# Patient Record
Sex: FEMALE | Race: WHITE | NOT HISPANIC OR LATINO | Employment: UNEMPLOYED | URBAN - NONMETROPOLITAN AREA
[De-identification: names, ages, dates, MRNs, and addresses within clinical notes are randomized per-mention and may not be internally consistent; named-entity substitution may affect disease eponyms.]

---
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
## 2023-02-10 ENCOUNTER — OFFICE VISIT (OUTPATIENT)
Dept: FAMILY MEDICINE CLINIC | Facility: CLINIC | Age: 18
End: 2023-02-10
Payer: COMMERCIAL

## 2023-02-10 VITALS
DIASTOLIC BLOOD PRESSURE: 76 MMHG | OXYGEN SATURATION: 98 % | BODY MASS INDEX: 37.05 KG/M2 | SYSTOLIC BLOOD PRESSURE: 122 MMHG | HEART RATE: 87 BPM | HEIGHT: 64 IN | WEIGHT: 217 LBS

## 2023-02-10 DIAGNOSIS — F41.9 ANXIETY: ICD-10-CM

## 2023-02-10 DIAGNOSIS — R45.4 OUTBURSTS OF ANGER: ICD-10-CM

## 2023-02-10 DIAGNOSIS — G89.29 CHRONIC MIDLINE LOW BACK PAIN WITHOUT SCIATICA: ICD-10-CM

## 2023-02-10 DIAGNOSIS — M54.50 CHRONIC MIDLINE LOW BACK PAIN WITHOUT SCIATICA: ICD-10-CM

## 2023-02-10 PROCEDURE — 99214 OFFICE O/P EST MOD 30 MIN: CPT | Performed by: FAMILY MEDICINE

## 2023-02-10 RX ORDER — CELECOXIB 100 MG/1
100 CAPSULE ORAL DAILY
Qty: 60 CAPSULE | Refills: 3 | Status: SHIPPED | OUTPATIENT
Start: 2023-02-10

## 2023-02-10 RX ORDER — BUPROPION HYDROCHLORIDE 150 MG/1
TABLET, EXTENDED RELEASE ORAL
Qty: 60 TABLET | Refills: 2 | Status: SHIPPED | OUTPATIENT
Start: 2023-02-10

## 2023-02-10 NOTE — PROGRESS NOTES
Subjective   SHEA ORTIZ is a 18 y.o. female.   Cc: follow up of chronic medical issues    Anxiety  Presents for follow-up visit. Symptoms include decreased concentration, depressed mood, excessive worry, insomnia, irritability, nervous/anxious behavior, palpitations and restlessness. Patient reports no chest pain, compulsions, confusion, dizziness, feeling of choking, hyperventilation, obsessions, panic, shortness of breath or suicidal ideas.     Her past medical history is significant for depression.   Depression  Visit Type: follow-up  Patient presents with the following symptoms: decreased concentration, depressed mood, excessive worry, fatigue, insomnia, irritability, nervousness/anxiety, palpitations and restlessness.  Patient is not experiencing: anhedonia, chest pain, compulsions, confusion, dizziness, feelings of hopelessness, feelings of worthlessness, hypersomnia, hyperventilation, obsessions, panic, shortness of breath, suicidal ideas, suicidal planning, thoughts of death and weight loss.    Back Pain  This is a chronic problem. The current episode started more than 1 month ago. The problem occurs constantly. The problem has been gradually worsening since onset. The pain is present in the lumbar spine and sacro-iliac. The quality of the pain is described as burning, shooting and stabbing. The pain does not radiate. The pain is at a severity of 9/10. The pain is the same all the time. The symptoms are aggravated by bending, coughing, position, standing and twisting. Pertinent negatives include no abdominal pain, bladder incontinence, bowel incontinence, chest pain, dysuria, fever, headaches, leg pain, numbness, paresis, paresthesias, pelvic pain, perianal numbness, tingling, weakness or weight loss.       The following portions of the patient's history were reviewed and updated as appropriate: allergies, current medications, past family history, past medical history, past social history, past  surgical history and problem list.    Review of Systems   Constitutional: Positive for irritability. Negative for fever and weight loss.   Respiratory: Negative for shortness of breath.    Cardiovascular: Positive for palpitations. Negative for chest pain.   Gastrointestinal: Negative for abdominal pain and bowel incontinence.   Genitourinary: Negative for bladder incontinence, dysuria and pelvic pain.   Musculoskeletal: Positive for back pain.   Neurological: Negative for dizziness, tingling, weakness, numbness, headaches and paresthesias.   Psychiatric/Behavioral: Positive for decreased concentration. Negative for confusion and suicidal ideas. The patient is nervous/anxious and has insomnia.        Objective   Physical Exam  Vitals reviewed.   Constitutional:       Appearance: Normal appearance.   HENT:      Head: Normocephalic and atraumatic.      Right Ear: Tympanic membrane, ear canal and external ear normal.      Left Ear: Tympanic membrane, ear canal and external ear normal.      Nose: Nose normal.      Mouth/Throat:      Mouth: Mucous membranes are moist.      Pharynx: Oropharynx is clear.   Cardiovascular:      Rate and Rhythm: Normal rate and regular rhythm.      Heart sounds: Normal heart sounds. No murmur heard.    No friction rub. No gallop.   Pulmonary:      Effort: Pulmonary effort is normal. No respiratory distress.      Breath sounds: Normal breath sounds. No stridor. No wheezing, rhonchi or rales.   Chest:      Chest wall: No tenderness.   Abdominal:      General: Bowel sounds are normal. There is no distension.      Palpations: Abdomen is soft. There is no mass.      Tenderness: There is no abdominal tenderness. There is no guarding or rebound.      Hernia: No hernia is present.   Musculoskeletal:      Cervical back: Normal range of motion and neck supple.      Comments: The low back is tender on palpation.   Skin:     General: Skin is warm and dry.   Neurological:      Mental Status: She is  "alert.           Visit Vitals  /76   Pulse 87   Ht 162.6 cm (64\")   Wt 98.4 kg (217 lb)   LMP 01/12/2023 (Exact Date)   SpO2 98%   Breastfeeding No   BMI 37.25 kg/m²     Body mass index is 37.25 kg/m².      Assessment/Plan   Diagnoses and all orders for this visit:    1. Postpartum depression (Primary)  -     Ambulatory Referral to Psychology  -     T4, free; Future  -     TSH; Future  -     CBC No Differential; Future  -     Comprehensive metabolic panel; Future    2. Chronic midline low back pain without sciatica    3. Outbursts of anger  -     Ambulatory Referral to Psychology    4. Anxiety    Other orders  -     celecoxib (CeleBREX) 100 MG capsule; Take 1 capsule by mouth Daily.  Dispense: 60 capsule; Refill: 3  -     buPROPion SR (Wellbutrin SR) 150 MG 12 hr tablet; One daily times 3 days, then bid.  Dispense: 60 tablet; Refill: 2    I reviewed her CBC and CMP with the patient.  Return to the clinic in 1 month/s.  Will contact with results as needed.          This document has been electronically signed by Gal Alberts MD on February 10, 2023 16:26 CST    Answers for HPI/ROS submitted by the patient on 2/4/2023  What is the primary reason for your visit?: Back Pain      "

## 2023-06-13 PROBLEM — H60.332 ACUTE SWIMMER'S EAR OF LEFT SIDE: Status: ACTIVE | Noted: 2023-06-13

## 2023-07-12 PROBLEM — O99.211 MATERNAL OBESITY WITHOUT HYPERTENSION, IN FIRST TRIMESTER: Status: ACTIVE | Noted: 2023-07-12

## 2023-07-12 PROBLEM — O21.9 NAUSEA AND VOMITING DURING PREGNANCY PRIOR TO 22 WEEKS GESTATION: Status: ACTIVE | Noted: 2023-07-12

## 2023-07-12 PROBLEM — Z64.1 MULTIGRAVIDA: Status: ACTIVE | Noted: 2023-07-12

## 2023-07-20 ENCOUNTER — LAB (OUTPATIENT)
Dept: LAB | Facility: HOSPITAL | Age: 18
End: 2023-07-20
Payer: COMMERCIAL

## 2023-07-20 DIAGNOSIS — O99.810 ABNORMAL GLUCOSE IN PREGNANCY, ANTEPARTUM: ICD-10-CM

## 2023-07-20 LAB
GLUCOSE 1H P 100 G GLC PO SERPL-MCNC: 123 MG/DL (ref 74–180)
GLUCOSE 2H P 100 G GLC PO SERPL-MCNC: 117 MG/DL (ref 74–155)
GLUCOSE 3H P 100 G GLC PO SERPL-MCNC: 90 MG/DL (ref 74–140)
GLUCOSE P FAST SERPL-MCNC: 93 MG/DL (ref 74–106)

## 2023-07-20 PROCEDURE — 36415 COLL VENOUS BLD VENIPUNCTURE: CPT

## 2023-07-20 PROCEDURE — 82952 GTT-ADDED SAMPLES: CPT

## 2023-07-20 PROCEDURE — 82951 GLUCOSE TOLERANCE TEST (GTT): CPT

## 2023-07-26 ENCOUNTER — LAB (OUTPATIENT)
Dept: LAB | Facility: HOSPITAL | Age: 18
End: 2023-07-26
Payer: COMMERCIAL

## 2023-07-26 DIAGNOSIS — Z13.79 GENETIC SCREENING: ICD-10-CM

## 2023-07-30 LAB
Lab: NORMAL
NTRA FETAL FRACTION: NORMAL
NTRA GENDER OF FETUS: NORMAL
NTRA MONOSOMY X AGE-BASED RISK TEXT: NORMAL
NTRA MONOSOMY X RESULT TEXT: NORMAL
NTRA MONOSOMY X RISK SCORE TEXT: NORMAL
NTRA TRIPLOIDY RESULT TEXT: NORMAL
NTRA TRISOMY 13 AGE-BASED RISK TEXT: NORMAL
NTRA TRISOMY 13 RESULT TEXT: NORMAL
NTRA TRISOMY 13 RISK SCORE TEXT: NORMAL
NTRA TRISOMY 18 AGE-BASED RISK TEXT: NORMAL
NTRA TRISOMY 18 RESULT TEXT: NORMAL
NTRA TRISOMY 18 RISK SCORE TEXT: NORMAL
NTRA TRISOMY 21 AGE-BASED RISK TEXT: NORMAL
NTRA TRISOMY 21 RESULT TEXT: NORMAL
NTRA TRISOMY 21 RISK SCORE TEXT: NORMAL

## 2023-08-01 LAB
Lab: NEGATIVE
Lab: NORMAL
NTRA ALPHA-THALASSEMIA: NEGATIVE
NTRA BETA-HEMOGLOBINOPATHIES: NEGATIVE
NTRA CANAVAN DISEASE: NEGATIVE
NTRA CYSTIC FIBROSIS: NEGATIVE
NTRA DUCHENNE/BECKER MUSCULAR DYSTROPHY: NEGATIVE
NTRA FAMILIAL DYSAUTONOMIA: NEGATIVE
NTRA FRAGILE X SYNDROME: NEGATIVE
NTRA GALACTOSEMIA: NEGATIVE
NTRA GAUCHER DISEASE: NEGATIVE
NTRA MEDIUM CHAIN ACYL-COA DEHYDROGENASE DEFICIENCY: NEGATIVE
NTRA POLYCYSTIC KIDNEY DISEASE, AUTOSOMAL RECESSIVE: NEGATIVE
NTRA SMITH-LEMLI-OPITZ SYNDROME: NEGATIVE
NTRA SPINAL MUSCULAR ATROPHY: NEGATIVE
NTRA TAY-SACHS DISEASE: NEGATIVE

## 2023-08-08 ENCOUNTER — CLINICAL SUPPORT (OUTPATIENT)
Dept: OBSTETRICS AND GYNECOLOGY | Facility: CLINIC | Age: 18
End: 2023-08-08
Payer: COMMERCIAL

## 2023-08-08 DIAGNOSIS — N76.0 ACUTE VAGINITIS: Primary | ICD-10-CM

## 2023-08-08 DIAGNOSIS — N76.0 ACUTE VAGINITIS: ICD-10-CM

## 2023-08-08 LAB
BACTERIAL VAGINOSIS VAG-IMP: POSITIVE
CANDIDA DNA VAG QL NAA+PROBE: DETECTED
CANDIDA DNA VAG QL NAA+PROBE: NOT DETECTED
T VAGINALIS DNA VAG QL NAA+PROBE: NOT DETECTED

## 2023-08-08 PROCEDURE — 0352U HC INF DIS BACTERIAL VAGINOSIS AND VAGINITIS AMPLIFIED PROBE TECHNIQUE: CPT | Performed by: NURSE PRACTITIONER

## 2023-08-09 RX ORDER — FLUCONAZOLE 150 MG/1
TABLET ORAL
Qty: 2 TABLET | Refills: 0 | Status: SHIPPED | OUTPATIENT
Start: 2023-08-09

## 2023-08-09 RX ORDER — METRONIDAZOLE 500 MG/1
500 TABLET ORAL 2 TIMES DAILY
Qty: 14 TABLET | Refills: 0 | Status: SHIPPED | OUTPATIENT
Start: 2023-08-09 | End: 2023-08-16

## 2023-08-10 ENCOUNTER — ROUTINE PRENATAL (OUTPATIENT)
Dept: OBSTETRICS AND GYNECOLOGY | Facility: CLINIC | Age: 18
End: 2023-08-10
Payer: COMMERCIAL

## 2023-08-10 VITALS — BODY MASS INDEX: 32.75 KG/M2 | WEIGHT: 190.8 LBS | DIASTOLIC BLOOD PRESSURE: 70 MMHG | SYSTOLIC BLOOD PRESSURE: 114 MMHG

## 2023-08-10 DIAGNOSIS — O09.92 SUPERVISION OF HIGH RISK PREGNANCY IN SECOND TRIMESTER: Primary | ICD-10-CM

## 2023-08-10 DIAGNOSIS — O99.211 OBESITY AFFECTING PREGNANCY IN FIRST TRIMESTER: ICD-10-CM

## 2023-08-10 DIAGNOSIS — O21.9 NAUSEA AND VOMITING DURING PREGNANCY PRIOR TO 22 WEEKS GESTATION: ICD-10-CM

## 2023-08-10 DIAGNOSIS — Z36.89 ENCOUNTER FOR FETAL ANATOMIC SURVEY: ICD-10-CM

## 2023-08-10 DIAGNOSIS — Z3A.13 13 WEEKS GESTATION OF PREGNANCY: ICD-10-CM

## 2023-08-10 PROBLEM — E66.9 OBESITY (BMI 30.0-34.9): Status: RESOLVED | Noted: 2022-10-07 | Resolved: 2023-08-10

## 2023-08-10 PROBLEM — H60.332 ACUTE SWIMMER'S EAR OF LEFT SIDE: Status: RESOLVED | Noted: 2023-06-13 | Resolved: 2023-08-10

## 2023-08-10 NOTE — PROGRESS NOTES
CC: Prenatal visit    Johanne Stone is a 18 y.o.  at 13w0d.  Doing well.  Denies contractions, LOF, or VB.  Taking ABX for yeast and BV infections.  Found out that she is having a boy, planning to name him [place].    /70   Wt 86.5 kg (190 lb 12.8 oz)   LMP  (LMP Unknown)   BMI 32.75 kg/mý   Fetal Heart Rate: 150s     Problems (from 23 to present)       Problem Noted Resolved    Obesity affecting pregnancy in first trimester 2023 by Lima Almanza APRN No    Overview Signed 8/10/2023 10:05 AM by Mady Thomas MD     Class II obesity, PG BMI 35.8         Supervision of high risk pregnancy in second trimester 2023 by Lima Almanza, PAULINA No    Nausea and vomiting during pregnancy prior to 22 weeks gestation 2023 by Lima Almanza APRN No          A/P: Johanne Stone is a 18 y.o.  at 13w0d.  - RTC in 5 weeks w/ anatomy US  - RTC in 9 weeks     Diagnosis Plan   1. Supervision of high risk pregnancy in second trimester        2. Obesity affecting pregnancy in first trimester        3. Nausea and vomiting during pregnancy prior to 22 weeks gestation        4. Encounter for fetal anatomic survey  US Ob 14 + Weeks Single or First Gestation      5. 13 weeks gestation of pregnancy          Mady Thomas MD  8/10/2023  10:05 CDT

## 2023-08-16 ENCOUNTER — PATIENT OUTREACH (OUTPATIENT)
Dept: OBSTETRICS AND GYNECOLOGY | Facility: HOSPITAL | Age: 18
End: 2023-08-16
Payer: COMMERCIAL

## 2023-08-16 NOTE — OUTREACH NOTE
[facility]  Unable to Reach       Questions/Answers      Flowsheet Row Responses   Pending Outreach Confirm Patient Interest   Call Attempt First   Outcome No answer/busy, MyChart message sent to patient   Next Call Attempt Date 08/21/23   Unable to reach comments: voicemail not set up yet                Shana Pierce RN  Maternity Nurse Navigator    8/16/2023, 13:53 CDT

## 2023-08-21 ENCOUNTER — PATIENT OUTREACH (OUTPATIENT)
Dept: LABOR AND DELIVERY | Facility: HOSPITAL | Age: 18
End: 2023-08-21
Payer: COMMERCIAL

## 2023-08-21 NOTE — OUTREACH NOTE
[facility]  Enrollment    Current Estimated Gestational Age: 14w4d    Questions/Answers    Flowsheet Row Responses   Would like to participate? Yes   Date of Intake Visit 08/28/23          Patient wants to be enrolled in program. Will complete intake on 8/28/23    Adeline Pierce RN  Maternity Nurse Navigator    8/21/2023, 17:37 CDT

## 2023-08-28 ENCOUNTER — PATIENT OUTREACH (OUTPATIENT)
Dept: LABOR AND DELIVERY | Facility: HOSPITAL | Age: 18
End: 2023-08-28
Payer: COMMERCIAL

## 2023-08-28 NOTE — OUTREACH NOTE
[facility]  Unable to Reach       Questions/Answers    Flowsheet Row Responses   Pending Outreach Intake Visit   Call Attempt First   Outcome No answer/busy, GemPhoneshart message sent to patient   Next Call Attempt Date 08/30/23   Unable to reach comments: voicemail not set up yet          Tried reaching out to patient today to complete intake. No voicemail set up. Sent a Kwarterhart message, waiting for reply. Will try again on Wed the 30th.    Adeline Pierce RN  Maternity Nurse Navigator    8/28/2023, 14:38 CDT

## 2023-08-28 NOTE — OUTREACH NOTE
[facility]  Intake    Current Estimated Gestational Age: 15w4d    Intake Assessment    Flowsheet Row Responses   Best Method for Contacting Cell   Currently Employed No   Able to keep appointments as scheduled Yes   Gender(s) and Name(s) boy- unsure   Do you have a dentist? No   Resources Presently Utilizing: WIC (Women, Infant, Children)   Maternal Warning Signs Provided   Other: Provided   Comments [facility]   Other Education HANDS, How to find a dentist, Insurance benefits/Incentives, Other          Learning Assessment    Flowsheet Row Responses   Relationship Patient   Learner Name Johanne   Does the learner have any barriers to learning? No Barriers   What is the preferred language of the learner for medical teaching? English   Is an  required? No   How does the learner prefer to learn new concepts? Listening, Reading, Demonstration, Pictures/Video          Have supplies left over from son for this baby. Doesn't have a regular dentist. No concerns except for having 2 babies so close together. A little depressed because her great-grandmother won't be here for delivery. She passed away last year before her first son was born. Sending some resources for depression.  Tobacco, Alcohol, and Drug History     reports that she has never smoked. She has never used smokeless tobacco.   reports that she does not currently use alcohol.   reports that she does not currently use drugs after having used the following drugs: Marijuana.    Adeline Pierce RN  Maternity Nurse Navigator    8/28/2023, 15:19 CDT

## 2023-09-11 ENCOUNTER — CLINICAL SUPPORT (OUTPATIENT)
Dept: OBSTETRICS AND GYNECOLOGY | Facility: CLINIC | Age: 18
End: 2023-09-11
Payer: COMMERCIAL

## 2023-09-11 DIAGNOSIS — N76.0 ACUTE VAGINITIS: ICD-10-CM

## 2023-09-11 DIAGNOSIS — N76.0 ACUTE VAGINITIS: Primary | ICD-10-CM

## 2023-09-11 LAB
BACTERIAL VAGINOSIS VAG-IMP: NEGATIVE
CANDIDA DNA VAG QL NAA+PROBE: DETECTED
CANDIDA DNA VAG QL NAA+PROBE: NOT DETECTED
T VAGINALIS DNA VAG QL NAA+PROBE: NOT DETECTED

## 2023-09-11 PROCEDURE — 0352U HC INF DIS BACTERIAL VAGINOSIS AND VAGINITIS AMPLIFIED PROBE TECHNIQUE: CPT | Performed by: OBSTETRICS & GYNECOLOGY

## 2023-09-11 RX ORDER — FLUCONAZOLE 150 MG/1
150 TABLET ORAL DAILY
Qty: 5 TABLET | Refills: 0 | Status: SHIPPED | OUTPATIENT
Start: 2023-09-11 | End: 2023-09-14

## 2023-09-14 ENCOUNTER — ROUTINE PRENATAL (OUTPATIENT)
Dept: OBSTETRICS AND GYNECOLOGY | Facility: CLINIC | Age: 18
End: 2023-09-14
Payer: COMMERCIAL

## 2023-09-14 VITALS — WEIGHT: 193 LBS | BODY MASS INDEX: 33.13 KG/M2 | SYSTOLIC BLOOD PRESSURE: 116 MMHG | DIASTOLIC BLOOD PRESSURE: 70 MMHG

## 2023-09-14 DIAGNOSIS — O99.211 OBESITY AFFECTING PREGNANCY IN FIRST TRIMESTER: ICD-10-CM

## 2023-09-14 DIAGNOSIS — O09.92 SUPERVISION OF HIGH RISK PREGNANCY IN SECOND TRIMESTER: ICD-10-CM

## 2023-09-14 DIAGNOSIS — Z3A.18 18 WEEKS GESTATION OF PREGNANCY: Primary | ICD-10-CM

## 2023-09-14 PROBLEM — O21.9 NAUSEA AND VOMITING DURING PREGNANCY PRIOR TO 22 WEEKS GESTATION: Status: RESOLVED | Noted: 2023-07-12 | Resolved: 2023-09-14

## 2023-09-14 PROCEDURE — 99213 OFFICE O/P EST LOW 20 MIN: CPT | Performed by: NURSE PRACTITIONER

## 2023-09-14 NOTE — PROGRESS NOTES
CC: Prenatal visit    Johanne Stone is a 18 y.o.  at 18w0d.  Doing well.  Patient curious when she will get good pictures of her baby's face.  Encouraged her to go to ultrasound boutique in third trimester. Denies contractions, LOF, or VB.  Reports +FM.    /70   Wt 87.5 kg (193 lb)   LMP  (LMP Unknown)   BMI 33.13 kg/m²      Fetal anatomy US preliminary-fetus cephalic, KYMBERLY 12.88 cm, placenta right anterior, no previa, BPD 63.8%tile, HC 36.9%tile, AC 57.6%tile, FL 57.4%tile, HL 75.8%tile, EFW 228g/ 8 oz @ 56.7%tile, all fetal anatomy views obtained, 3vc, CL 4.01 cm, bilateral ovaries wnl        Problems (from 23 to present)       Problem Noted Resolved    Obesity affecting pregnancy in first trimester 2023 by Lima Almanza APRN No    Overview Signed 8/10/2023 10:05 AM by Mady Thomas MD     Class II obesity, PG BMI 35.8         Supervision of high risk pregnancy in second trimester 2023 by Lima Almanza APRN No    Nausea and vomiting during pregnancy prior to 22 weeks gestation 2023 by Lima Almanza APRN 2023 by Lima Almanza APRN            A/P: Johanne Stone is a 18 y.o.  at 18w0d.  - RTC in 4 weeks     Diagnosis Plan   1. 18 weeks gestation of pregnancy        2. Supervision of high risk pregnancy in second trimester        3. Obesity affecting pregnancy in first trimester            PAULINA Villanueva  2023  10:30 CDT

## 2023-11-21 NOTE — PROGRESS NOTES
Nicotine patch okay Patient was seen today for 45 minute therapy appointment.      She comes in looking emotional despondent.  She's had frequent emotional meltdown since I last saw her.  She reported that her emotions are all over the place, she stresses out, and she over thinks things.  I try to work with her on identifying stressors, and dad identifying whether there logical are logical and then acting accordingly.  If there are any logical then she should do something to distract herself to calm herself down.  She had another issue which was about her boyfriend.  She thinks their relationship is going downhill.  That he doesn't talk to her anymore.  That is trying to push her away.  They have a long emotional conversations that leave her distressed.  He seems to be trying to push her away, she's trying to prove to him how much she cares    We discussed her relationship, her her feelings about being trapped, her discouragement at not being able to make him see how much she cares.    We'll continue this discussion during next appointment in 1 month